# Patient Record
Sex: FEMALE | Race: ASIAN | NOT HISPANIC OR LATINO
[De-identification: names, ages, dates, MRNs, and addresses within clinical notes are randomized per-mention and may not be internally consistent; named-entity substitution may affect disease eponyms.]

---

## 2021-07-29 ENCOUNTER — TRANSCRIPTION ENCOUNTER (OUTPATIENT)
Age: 81
End: 2021-07-29

## 2021-07-29 ENCOUNTER — EMERGENCY (EMERGENCY)
Facility: HOSPITAL | Age: 81
LOS: 1 days | Discharge: HOME | End: 2021-07-29
Attending: EMERGENCY MEDICINE
Payer: MEDICARE

## 2021-07-29 VITALS
TEMPERATURE: 98 F | DIASTOLIC BLOOD PRESSURE: 78 MMHG | RESPIRATION RATE: 16 BRPM | SYSTOLIC BLOOD PRESSURE: 142 MMHG | HEART RATE: 71 BPM | OXYGEN SATURATION: 100 %

## 2021-07-29 VITALS
WEIGHT: 110.01 LBS | RESPIRATION RATE: 18 BRPM | OXYGEN SATURATION: 98 % | TEMPERATURE: 98 F | DIASTOLIC BLOOD PRESSURE: 87 MMHG | SYSTOLIC BLOOD PRESSURE: 153 MMHG | HEART RATE: 80 BPM

## 2021-07-29 LAB
ALBUMIN SERPL ELPH-MCNC: 4.4 G/DL — SIGNIFICANT CHANGE UP (ref 3.3–5)
ALP SERPL-CCNC: 92 U/L — SIGNIFICANT CHANGE UP (ref 40–120)
ALT FLD-CCNC: 17 U/L — SIGNIFICANT CHANGE UP (ref 10–45)
ANION GAP SERPL CALC-SCNC: 13 MMOL/L — SIGNIFICANT CHANGE UP (ref 5–17)
AST SERPL-CCNC: 17 U/L — SIGNIFICANT CHANGE UP (ref 10–40)
BASOPHILS # BLD AUTO: 0 K/UL — SIGNIFICANT CHANGE UP (ref 0–0.2)
BASOPHILS NFR BLD AUTO: 0 % — SIGNIFICANT CHANGE UP (ref 0–2)
BILIRUB SERPL-MCNC: 0.5 MG/DL — SIGNIFICANT CHANGE UP (ref 0.2–1.2)
BUN SERPL-MCNC: 10 MG/DL — SIGNIFICANT CHANGE UP (ref 7–23)
CALCIUM SERPL-MCNC: 9.8 MG/DL — SIGNIFICANT CHANGE UP (ref 8.4–10.5)
CHLORIDE SERPL-SCNC: 99 MMOL/L — SIGNIFICANT CHANGE UP (ref 96–108)
CO2 SERPL-SCNC: 25 MMOL/L — SIGNIFICANT CHANGE UP (ref 22–31)
CREAT SERPL-MCNC: 0.61 MG/DL — SIGNIFICANT CHANGE UP (ref 0.5–1.3)
EOSINOPHIL # BLD AUTO: 0 K/UL — SIGNIFICANT CHANGE UP (ref 0–0.5)
EOSINOPHIL NFR BLD AUTO: 0 % — SIGNIFICANT CHANGE UP (ref 0–6)
GLUCOSE SERPL-MCNC: 234 MG/DL — HIGH (ref 70–99)
HCT VFR BLD CALC: 45.7 % — HIGH (ref 34.5–45)
HGB BLD-MCNC: 15.3 G/DL — SIGNIFICANT CHANGE UP (ref 11.5–15.5)
LIDOCAIN IGE QN: 21 U/L — SIGNIFICANT CHANGE UP (ref 7–60)
LYMPHOCYTES # BLD AUTO: 2.18 K/UL — SIGNIFICANT CHANGE UP (ref 1–3.3)
LYMPHOCYTES # BLD AUTO: 22 % — SIGNIFICANT CHANGE UP (ref 13–44)
MAGNESIUM SERPL-MCNC: 2 MG/DL — SIGNIFICANT CHANGE UP (ref 1.6–2.6)
MANUAL SMEAR VERIFICATION: SIGNIFICANT CHANGE UP
MCHC RBC-ENTMCNC: 30.2 PG — SIGNIFICANT CHANGE UP (ref 27–34)
MCHC RBC-ENTMCNC: 33.5 GM/DL — SIGNIFICANT CHANGE UP (ref 32–36)
MCV RBC AUTO: 90.3 FL — SIGNIFICANT CHANGE UP (ref 80–100)
MONOCYTES # BLD AUTO: 0.89 K/UL — SIGNIFICANT CHANGE UP (ref 0–0.9)
MONOCYTES NFR BLD AUTO: 9 % — SIGNIFICANT CHANGE UP (ref 2–14)
NEUTROPHILS # BLD AUTO: 6.84 K/UL — SIGNIFICANT CHANGE UP (ref 1.8–7.4)
NEUTROPHILS NFR BLD AUTO: 66 % — SIGNIFICANT CHANGE UP (ref 43–77)
NEUTS BAND # BLD: 3 % — SIGNIFICANT CHANGE UP (ref 0–8)
NRBC # BLD: 0 /100 — SIGNIFICANT CHANGE UP (ref 0–0)
PLAT MORPH BLD: NORMAL — SIGNIFICANT CHANGE UP
PLATELET # BLD AUTO: 205 K/UL — SIGNIFICANT CHANGE UP (ref 150–400)
POTASSIUM SERPL-MCNC: 4 MMOL/L — SIGNIFICANT CHANGE UP (ref 3.5–5.3)
POTASSIUM SERPL-SCNC: 4 MMOL/L — SIGNIFICANT CHANGE UP (ref 3.5–5.3)
PROT SERPL-MCNC: 8.4 G/DL — HIGH (ref 6–8.3)
RBC # BLD: 5.06 M/UL — SIGNIFICANT CHANGE UP (ref 3.8–5.2)
RBC # FLD: 12.3 % — SIGNIFICANT CHANGE UP (ref 10.3–14.5)
RBC BLD AUTO: SIGNIFICANT CHANGE UP
SODIUM SERPL-SCNC: 137 MMOL/L — SIGNIFICANT CHANGE UP (ref 135–145)
TROPONIN T, HIGH SENSITIVITY RESULT: 6 NG/L — SIGNIFICANT CHANGE UP (ref 0–51)
WBC # BLD: 9.91 K/UL — SIGNIFICANT CHANGE UP (ref 3.8–10.5)
WBC # FLD AUTO: 9.91 K/UL — SIGNIFICANT CHANGE UP (ref 3.8–10.5)

## 2021-07-29 PROCEDURE — 84484 ASSAY OF TROPONIN QUANT: CPT

## 2021-07-29 PROCEDURE — 99284 EMERGENCY DEPT VISIT MOD MDM: CPT | Mod: 25

## 2021-07-29 PROCEDURE — 99285 EMERGENCY DEPT VISIT HI MDM: CPT

## 2021-07-29 PROCEDURE — 96374 THER/PROPH/DIAG INJ IV PUSH: CPT

## 2021-07-29 PROCEDURE — 93005 ELECTROCARDIOGRAM TRACING: CPT

## 2021-07-29 PROCEDURE — 80053 COMPREHEN METABOLIC PANEL: CPT

## 2021-07-29 PROCEDURE — 71045 X-RAY EXAM CHEST 1 VIEW: CPT | Mod: 26

## 2021-07-29 PROCEDURE — 83690 ASSAY OF LIPASE: CPT

## 2021-07-29 PROCEDURE — 71045 X-RAY EXAM CHEST 1 VIEW: CPT

## 2021-07-29 PROCEDURE — 83735 ASSAY OF MAGNESIUM: CPT

## 2021-07-29 PROCEDURE — 85025 COMPLETE CBC W/AUTO DIFF WBC: CPT

## 2021-07-29 PROCEDURE — 93010 ELECTROCARDIOGRAM REPORT: CPT

## 2021-07-29 RX ORDER — FAMOTIDINE 10 MG/ML
20 INJECTION INTRAVENOUS ONCE
Refills: 0 | Status: COMPLETED | OUTPATIENT
Start: 2021-07-29 | End: 2021-07-29

## 2021-07-29 RX ORDER — SUCRALFATE 1 G
1 TABLET ORAL ONCE
Refills: 0 | Status: COMPLETED | OUTPATIENT
Start: 2021-07-29 | End: 2021-07-29

## 2021-07-29 RX ORDER — FAMOTIDINE 10 MG/ML
1 INJECTION INTRAVENOUS
Qty: 28 | Refills: 0
Start: 2021-07-29 | End: 2021-08-11

## 2021-07-29 RX ADMIN — FAMOTIDINE 20 MILLIGRAM(S): 10 INJECTION INTRAVENOUS at 12:08

## 2021-07-29 RX ADMIN — Medication 30 MILLILITER(S): at 12:08

## 2021-07-29 RX ADMIN — Medication 1 GRAM(S): at 15:07

## 2021-07-29 NOTE — ED PROVIDER NOTE - CHIEF COMPLAINT
The patient is a 80y Female complaining of  The patient is a 80y Female complaining of abd discomfort

## 2021-07-29 NOTE — ED PROVIDER NOTE - PATIENT PORTAL LINK FT
You can access the FollowMyHealth Patient Portal offered by [facility] by registering at the following website: [email or web address]. By joining [facility]’s FollowMyHealth portal, you will also be able to view your health information using other applications (apps) compatible with our system.

## 2021-07-29 NOTE — ED PROVIDER NOTE - ATTENDING CONTRIBUTION TO CARE
80F hx HTN, hypothyroid, diabetes not on meds here with c/o epigastric abd pain x4 days, worse w eating, constant, non radiating, slight relief w omeprazole. No dark tarry or bloody stools. No NV, No fevers, no CP or SOB. PE well appearing NAD, RRR, lungs cta bl, abd soft, mild epigatsric TTP no grr. No le edema. Less likely cardiac though is an elderly diabetic female. EKG without actionable fidning. Check trop. More likely GI in origin. Check labs, meds, CXR to eval for air under diaphragms.

## 2021-07-29 NOTE — ED PROVIDER NOTE - NSFOLLOWUPINSTRUCTIONS_ED_ALL_ED_FT
There are no signs of emergency conditions requiring admission to the hospital on today's workup.  Based on the evaluation, a presumptive diagnosis was made, however, further evaluation may be required by your primary care physician or a specialist for a more definitive diagnosis.  Therefore, please follow-up as directed or return to the Emergency Department if your symptoms change or worsen.    We recommend that you:  1. See your primary care physician within the next 72 hours for follow up.  Bring a copy of your discharge paperwork (including any test results) to your doctor.  2. Please follow up with your gastroenterologist or the Gastroenterology clinic (information provided). Someone from [facility] will call you to help you make an appointment.   3. Please continue medications as prescribed.   4. Please avoid exacerbating foods such as spicy foods, coffee, alcohol.       *** Return immediately if you have worsening symptoms, chest pain, Shortness of Breath, abdominal pain, Nausea/Vomiting/Diarrhea, dizziness, weakness, confusion, vision changes, urinary symptoms, syncope, falls, trauma, discharge, bleeding, fevers, or any other new/concerning symptoms. *** There are no signs of emergency conditions requiring admission to the hospital on today's workup.  Based on the evaluation, a presumptive diagnosis was made, however, further evaluation may be required by your primary care physician or a specialist for a more definitive diagnosis.  Therefore, please follow-up as directed or return to the Emergency Department if your symptoms change or worsen.    We recommend that you:  1. See your primary care physician within the next 72 hours for follow up.  Bring a copy of your discharge paperwork (including any test results) to your doctor.  2. Please follow up with your gastroenterologist or the Gastroenterology clinic (information provided). Someone from [facility] will call you to help you make an appointment.   3. Please continue medications as prescribed.   4. Please avoid exacerbating foods such as spicy foods, coffee, alcohol.   5. Please take 1 tablet of 20mg Pepcid two times a day.  6. For additional relief take Maalox 1 hour before meals as needed.      *** Return immediately if you have worsening symptoms, chest pain, Shortness of Breath, abdominal pain, Nausea/Vomiting/Diarrhea, dizziness, weakness, confusion, vision changes, urinary symptoms, syncope, falls, trauma, discharge, bleeding, fevers, or any other new/concerning symptoms. ***

## 2021-07-29 NOTE — ED PROVIDER NOTE - PROGRESS NOTE DETAILS
Patient reassessed, resting in bed in no distress. still c.o some abd discofort will order carafate and reassess Nitish Cuello PA-C Patient reassessed, resting in bed in no distress. still c.o some abd discomfort will order Carafate and reassess Nitish Cuello PA-C Patient was able to tolerate PO challenge. Pt reassessed at bedside, feels well, pain controlled. Informed of workup in ED, reviewed lab and/or radiology results (when applicable) with patient/caregiver. Informed pt of plan for discharge with instructions to follow up with PMD and GI. Pt/caregiver expressed understanding of plan and agrees with plan for discharge. Strict return precautions discussed with patient in layman's terms, patient demonstrated understanding of return precautions. Nitish Cuello PA-C

## 2021-07-29 NOTE — ED PROVIDER NOTE - NS ED ROS FT
Review of Systems:  · Constitutional: no chills, no fever, no night sweats, no weight loss  · Nose: no epistaxis  · Mouth/Throat: no difficulty in swallowing, trachea midline, uvula midline  . Cardio: No CP, no palpitations, no chest pressure, no ripping chest pain  · Respiratory: no cough, no exertional dyspnea, no hemoptysis, no orthopnea, no shortness of breath  · Gastrointestinal: abdominal pain, no diarrhea, no melena, no nausea, no vomiting  · Genitourinary: no difficulty urinating, no dysuria, no hematuria  · MUSCULOSKELETAL: FROM of all extremities  · Skin: no abrasion; no bruising; no laceration  · Neurological: no change in level of consciousness, no headache, no seizures  · ROS STATEMENT: all other ROS negative except as per HPI Review of Systems:  · Constitutional: no chills, no fever, no night sweats, no weight loss  · Nose: no epistaxis  · Mouth/Throat: no difficulty in swallowing, trachea midline, uvula midline  . Cardio: No CP, no palpitations, no chest pressure, no ripping chest pain  · Respiratory: no cough, no exertional dyspnea, no hemoptysis, no orthopnea, no shortness of breath  · Gastrointestinal: abdominal pain, exacerbated with food, no diarrhea, no melena, no nausea, no vomiting  · Genitourinary: no difficulty urinating, no dysuria, no hematuria  · MUSCULOSKELETAL: FROM of all extremities  · Skin: no abrasion; no bruising; no laceration  · Neurological: no change in level of consciousness, no headache, no seizures  · ROS STATEMENT: all other ROS negative except as per HPI

## 2021-07-29 NOTE — ED PROVIDER NOTE - OBJECTIVE STATEMENT
80 year old fm with pmhx hypothyroid, DM, presents to the ED with abdominal discomfort x 5 days. patient reports sunday she was awaken with epigastric  abdominal pain and discomfort. patient reports since then there have been multiple episodes where the pain increased to the epigastric area. patient reports she took 20mg of omeprazole with some relief. patient decided to go to the ED for symptoms an EKG was done which showed "an abnormality" so they advised patient to go to the ED for evaluation. patient reports exacerbation of pain with food. patient reporting pain isolated to epigastric region. patient denies chest pain, Shortness of Breath, Nausea/Vomiting/Diarrhea, dizziness, weakness, confusion, vision changes, urinary symptoms, syncope, falls, trauma, discharge, bleeding, fevers 80 year old fm with pmhx hypothyroid, DM, presents to the ED with abdominal discomfort x 5 days. patient reports Sunday she was awaken with epigastric  abdominal pain and discomfort. patient reports since then there have been multiple episodes where the pain increased to the epigastric area. patient reports she took 20mg of omeprazole with some relief. patient decided to go to the ED for symptoms an EKG was done which showed "an abnormality" so they advised patient to go to the ED for evaluation. patient reports exacerbation of pain with food. patient reporting pain isolated to epigastric region. patient denies chest pain, Shortness of Breath, Nausea/Vomiting/Diarrhea, dizziness, weakness, confusion, vision changes, urinary symptoms, syncope, falls, trauma, discharge, bleeding, fevers

## 2021-07-29 NOTE — ED PROVIDER NOTE - PHYSICAL EXAMINATION
On Physical Exam:  General: well appearing, in NAD, speaking clearly in full sentences and without difficulty; cooperative with exam  HEENT: PERRL, MMM  Neck: no neck tenderness, no nuchal rigidity  Cardiac: normal s1, s2; RRR; no MGR  Lungs: CTABL  Abdomen: soft nontender/nondistended  : no bladder tenderness or distension  Skin: warm, intact, no rash  Extremities: no peripheral edema, no gross deformities  Neuro: no gross neurologic deficits On Physical Exam:  General: well appearing, in NAD, speaking clearly in full sentences and without difficulty; cooperative with exam  HEENT: PERRL, MMM  Neck: no neck tenderness, no nuchal rigidity  Cardiac: normal s1, s2; RRR; no MGR  Lungs: CTABL  Abdomen: slight discomfort to palpation to epigastric region, soft nontender/nondistended  : no bladder tenderness or distension  Skin: warm, intact, no rash  Extremities: no peripheral edema, no gross deformities  Neuro: no gross neurologic deficits

## 2022-02-11 ENCOUNTER — EMERGENCY (EMERGENCY)
Facility: HOSPITAL | Age: 82
LOS: 1 days | Discharge: HOME | End: 2022-02-11
Attending: EMERGENCY MEDICINE
Payer: MEDICARE

## 2022-02-11 VITALS
TEMPERATURE: 98 F | OXYGEN SATURATION: 95 % | RESPIRATION RATE: 18 BRPM | SYSTOLIC BLOOD PRESSURE: 157 MMHG | DIASTOLIC BLOOD PRESSURE: 79 MMHG | HEART RATE: 62 BPM

## 2022-02-11 VITALS
SYSTOLIC BLOOD PRESSURE: 158 MMHG | HEIGHT: 62 IN | DIASTOLIC BLOOD PRESSURE: 87 MMHG | HEART RATE: 68 BPM | OXYGEN SATURATION: 98 % | TEMPERATURE: 98 F | WEIGHT: 110.01 LBS | RESPIRATION RATE: 18 BRPM

## 2022-02-11 LAB
ALBUMIN SERPL ELPH-MCNC: 4.5 G/DL — SIGNIFICANT CHANGE UP (ref 3.3–5)
ALP SERPL-CCNC: 82 U/L — SIGNIFICANT CHANGE UP (ref 40–120)
ALT FLD-CCNC: 23 U/L — SIGNIFICANT CHANGE UP (ref 10–45)
ANION GAP SERPL CALC-SCNC: 15 MMOL/L — SIGNIFICANT CHANGE UP (ref 5–17)
APPEARANCE UR: CLEAR — SIGNIFICANT CHANGE UP
AST SERPL-CCNC: 22 U/L — SIGNIFICANT CHANGE UP (ref 10–40)
BACTERIA # UR AUTO: NEGATIVE — SIGNIFICANT CHANGE UP
BASE EXCESS BLDV CALC-SCNC: -10.5 MMOL/L — LOW (ref -2–2)
BASE EXCESS BLDV CALC-SCNC: 4.6 MMOL/L — HIGH (ref -2–2)
BASOPHILS # BLD AUTO: 0.03 K/UL — SIGNIFICANT CHANGE UP (ref 0–0.2)
BASOPHILS NFR BLD AUTO: 0.4 % — SIGNIFICANT CHANGE UP (ref 0–2)
BILIRUB SERPL-MCNC: 0.6 MG/DL — SIGNIFICANT CHANGE UP (ref 0.2–1.2)
BILIRUB UR-MCNC: NEGATIVE — SIGNIFICANT CHANGE UP
BUN SERPL-MCNC: 15 MG/DL — SIGNIFICANT CHANGE UP (ref 7–23)
CA-I SERPL-SCNC: 0.74 MMOL/L — LOW (ref 1.15–1.33)
CA-I SERPL-SCNC: 1.15 MMOL/L — SIGNIFICANT CHANGE UP (ref 1.15–1.33)
CALCIUM SERPL-MCNC: 9.3 MG/DL — SIGNIFICANT CHANGE UP (ref 8.4–10.5)
CHLORIDE BLDV-SCNC: 117 MMOL/L — HIGH (ref 96–108)
CHLORIDE BLDV-SCNC: 98 MMOL/L — SIGNIFICANT CHANGE UP (ref 96–108)
CHLORIDE SERPL-SCNC: 98 MMOL/L — SIGNIFICANT CHANGE UP (ref 96–108)
CO2 BLDV-SCNC: 15 MMOL/L — LOW (ref 22–26)
CO2 BLDV-SCNC: 30 MMOL/L — HIGH (ref 22–26)
CO2 SERPL-SCNC: 23 MMOL/L — SIGNIFICANT CHANGE UP (ref 22–31)
COLOR SPEC: COLORLESS — SIGNIFICANT CHANGE UP
CREAT SERPL-MCNC: 0.57 MG/DL — SIGNIFICANT CHANGE UP (ref 0.5–1.3)
DIFF PNL FLD: NEGATIVE — SIGNIFICANT CHANGE UP
EOSINOPHIL # BLD AUTO: 0.05 K/UL — SIGNIFICANT CHANGE UP (ref 0–0.5)
EOSINOPHIL NFR BLD AUTO: 0.6 % — SIGNIFICANT CHANGE UP (ref 0–6)
EPI CELLS # UR: 2 /HPF — SIGNIFICANT CHANGE UP
GAS PNL BLDV: 134 MMOL/L — LOW (ref 136–145)
GAS PNL BLDV: 142 MMOL/L — SIGNIFICANT CHANGE UP (ref 136–145)
GAS PNL BLDV: SIGNIFICANT CHANGE UP
GAS PNL BLDV: SIGNIFICANT CHANGE UP
GLUCOSE BLDV-MCNC: 157 MG/DL — HIGH (ref 70–99)
GLUCOSE BLDV-MCNC: 392 MG/DL — HIGH (ref 70–99)
GLUCOSE SERPL-MCNC: 328 MG/DL — HIGH (ref 70–99)
GLUCOSE UR QL: ABNORMAL
HCO3 BLDV-SCNC: 14 MMOL/L — LOW (ref 22–29)
HCO3 BLDV-SCNC: 29 MMOL/L — SIGNIFICANT CHANGE UP (ref 22–29)
HCT VFR BLD CALC: 44.4 % — SIGNIFICANT CHANGE UP (ref 34.5–45)
HCT VFR BLDA CALC: 27 % — LOW (ref 34.5–46.5)
HCT VFR BLDA CALC: 45 % — SIGNIFICANT CHANGE UP (ref 34.5–46.5)
HGB BLD CALC-MCNC: 14.9 G/DL — SIGNIFICANT CHANGE UP (ref 11.7–16.1)
HGB BLD CALC-MCNC: 9 G/DL — LOW (ref 11.7–16.1)
HGB BLD-MCNC: 15 G/DL — SIGNIFICANT CHANGE UP (ref 11.5–15.5)
HYALINE CASTS # UR AUTO: 0 /LPF — SIGNIFICANT CHANGE UP (ref 0–2)
IMM GRANULOCYTES NFR BLD AUTO: 0.4 % — SIGNIFICANT CHANGE UP (ref 0–1.5)
KETONES UR-MCNC: NEGATIVE — SIGNIFICANT CHANGE UP
LACTATE BLDV-MCNC: 1 MMOL/L — SIGNIFICANT CHANGE UP (ref 0.7–2)
LACTATE BLDV-MCNC: 2.6 MMOL/L — HIGH (ref 0.7–2)
LEUKOCYTE ESTERASE UR-ACNC: NEGATIVE — SIGNIFICANT CHANGE UP
LYMPHOCYTES # BLD AUTO: 1.91 K/UL — SIGNIFICANT CHANGE UP (ref 1–3.3)
LYMPHOCYTES # BLD AUTO: 23.8 % — SIGNIFICANT CHANGE UP (ref 13–44)
MCHC RBC-ENTMCNC: 31.1 PG — SIGNIFICANT CHANGE UP (ref 27–34)
MCHC RBC-ENTMCNC: 33.8 GM/DL — SIGNIFICANT CHANGE UP (ref 32–36)
MCV RBC AUTO: 91.9 FL — SIGNIFICANT CHANGE UP (ref 80–100)
MONOCYTES # BLD AUTO: 0.39 K/UL — SIGNIFICANT CHANGE UP (ref 0–0.9)
MONOCYTES NFR BLD AUTO: 4.9 % — SIGNIFICANT CHANGE UP (ref 2–14)
NEUTROPHILS # BLD AUTO: 5.61 K/UL — SIGNIFICANT CHANGE UP (ref 1.8–7.4)
NEUTROPHILS NFR BLD AUTO: 69.9 % — SIGNIFICANT CHANGE UP (ref 43–77)
NITRITE UR-MCNC: NEGATIVE — SIGNIFICANT CHANGE UP
NRBC # BLD: 0 /100 WBCS — SIGNIFICANT CHANGE UP (ref 0–0)
PCO2 BLDV: 25 MMHG — LOW (ref 39–42)
PCO2 BLDV: 42 MMHG — SIGNIFICANT CHANGE UP (ref 39–42)
PH BLDV: 7.35 — SIGNIFICANT CHANGE UP (ref 7.32–7.43)
PH BLDV: 7.45 — HIGH (ref 7.32–7.43)
PH UR: 8 — SIGNIFICANT CHANGE UP (ref 5–8)
PLATELET # BLD AUTO: 197 K/UL — SIGNIFICANT CHANGE UP (ref 150–400)
PO2 BLDV: 52 MMHG — HIGH (ref 25–45)
PO2 BLDV: 69 MMHG — HIGH (ref 25–45)
POTASSIUM BLDV-SCNC: 2.6 MMOL/L — CRITICAL LOW (ref 3.5–5.1)
POTASSIUM BLDV-SCNC: 3.3 MMOL/L — LOW (ref 3.5–5.1)
POTASSIUM SERPL-MCNC: 3.3 MMOL/L — LOW (ref 3.5–5.3)
POTASSIUM SERPL-SCNC: 3.3 MMOL/L — LOW (ref 3.5–5.3)
PROT SERPL-MCNC: 8.1 G/DL — SIGNIFICANT CHANGE UP (ref 6–8.3)
PROT UR-MCNC: ABNORMAL
RBC # BLD: 4.83 M/UL — SIGNIFICANT CHANGE UP (ref 3.8–5.2)
RBC # FLD: 12.4 % — SIGNIFICANT CHANGE UP (ref 10.3–14.5)
RBC CASTS # UR COMP ASSIST: 1 /HPF — SIGNIFICANT CHANGE UP (ref 0–4)
SAO2 % BLDV: 86.9 % — SIGNIFICANT CHANGE UP (ref 67–88)
SAO2 % BLDV: 94.9 % — HIGH (ref 67–88)
SODIUM SERPL-SCNC: 136 MMOL/L — SIGNIFICANT CHANGE UP (ref 135–145)
SP GR SPEC: 1.02 — SIGNIFICANT CHANGE UP (ref 1.01–1.02)
UROBILINOGEN FLD QL: NEGATIVE — SIGNIFICANT CHANGE UP
WBC # BLD: 8.02 K/UL — SIGNIFICANT CHANGE UP (ref 3.8–10.5)
WBC # FLD AUTO: 8.02 K/UL — SIGNIFICANT CHANGE UP (ref 3.8–10.5)
WBC UR QL: 0 /HPF — SIGNIFICANT CHANGE UP (ref 0–5)

## 2022-02-11 PROCEDURE — 81001 URINALYSIS AUTO W/SCOPE: CPT

## 2022-02-11 PROCEDURE — 84132 ASSAY OF SERUM POTASSIUM: CPT

## 2022-02-11 PROCEDURE — 93005 ELECTROCARDIOGRAM TRACING: CPT

## 2022-02-11 PROCEDURE — 99285 EMERGENCY DEPT VISIT HI MDM: CPT | Mod: 25

## 2022-02-11 PROCEDURE — 71045 X-RAY EXAM CHEST 1 VIEW: CPT | Mod: 26

## 2022-02-11 PROCEDURE — 70496 CT ANGIOGRAPHY HEAD: CPT | Mod: 26,MA

## 2022-02-11 PROCEDURE — 82803 BLOOD GASES ANY COMBINATION: CPT

## 2022-02-11 PROCEDURE — 85018 HEMOGLOBIN: CPT

## 2022-02-11 PROCEDURE — 80053 COMPREHEN METABOLIC PANEL: CPT

## 2022-02-11 PROCEDURE — 83735 ASSAY OF MAGNESIUM: CPT

## 2022-02-11 PROCEDURE — 85025 COMPLETE CBC W/AUTO DIFF WBC: CPT

## 2022-02-11 PROCEDURE — 83605 ASSAY OF LACTIC ACID: CPT

## 2022-02-11 PROCEDURE — 70450 CT HEAD/BRAIN W/O DYE: CPT | Mod: MA

## 2022-02-11 PROCEDURE — 70496 CT ANGIOGRAPHY HEAD: CPT | Mod: MA

## 2022-02-11 PROCEDURE — 70498 CT ANGIOGRAPHY NECK: CPT | Mod: MA

## 2022-02-11 PROCEDURE — 82947 ASSAY GLUCOSE BLOOD QUANT: CPT

## 2022-02-11 PROCEDURE — 84295 ASSAY OF SERUM SODIUM: CPT

## 2022-02-11 PROCEDURE — 71045 X-RAY EXAM CHEST 1 VIEW: CPT

## 2022-02-11 PROCEDURE — 82962 GLUCOSE BLOOD TEST: CPT

## 2022-02-11 PROCEDURE — 36415 COLL VENOUS BLD VENIPUNCTURE: CPT

## 2022-02-11 PROCEDURE — 93010 ELECTROCARDIOGRAM REPORT: CPT

## 2022-02-11 PROCEDURE — 82435 ASSAY OF BLOOD CHLORIDE: CPT

## 2022-02-11 PROCEDURE — 96374 THER/PROPH/DIAG INJ IV PUSH: CPT | Mod: XU

## 2022-02-11 PROCEDURE — 70498 CT ANGIOGRAPHY NECK: CPT | Mod: 26,MA

## 2022-02-11 PROCEDURE — 82330 ASSAY OF CALCIUM: CPT

## 2022-02-11 PROCEDURE — 85014 HEMATOCRIT: CPT

## 2022-02-11 RX ORDER — ONDANSETRON 8 MG/1
4 TABLET, FILM COATED ORAL ONCE
Refills: 0 | Status: COMPLETED | OUTPATIENT
Start: 2022-02-11 | End: 2022-02-11

## 2022-02-11 RX ORDER — SODIUM CHLORIDE 9 MG/ML
500 INJECTION INTRAMUSCULAR; INTRAVENOUS; SUBCUTANEOUS ONCE
Refills: 0 | Status: COMPLETED | OUTPATIENT
Start: 2022-02-11 | End: 2022-02-11

## 2022-02-11 RX ORDER — MECLIZINE HCL 12.5 MG
1 TABLET ORAL
Qty: 10 | Refills: 0
Start: 2022-02-11 | End: 2022-02-20

## 2022-02-11 RX ORDER — MECLIZINE HCL 12.5 MG
25 TABLET ORAL ONCE
Refills: 0 | Status: COMPLETED | OUTPATIENT
Start: 2022-02-11 | End: 2022-02-11

## 2022-02-11 RX ADMIN — Medication 25 MILLIGRAM(S): at 11:33

## 2022-02-11 RX ADMIN — SODIUM CHLORIDE 1000 MILLILITER(S): 9 INJECTION INTRAMUSCULAR; INTRAVENOUS; SUBCUTANEOUS at 11:30

## 2022-02-11 RX ADMIN — ONDANSETRON 4 MILLIGRAM(S): 8 TABLET, FILM COATED ORAL at 11:31

## 2022-02-11 NOTE — ED PROVIDER NOTE - NSFOLLOWUPINSTRUCTIONS_ED_ALL_ED_FT
A prescription was sent to your pharmacy to help treat your dizziness as needed. Please follow up with the neurology team with the information attached within 1 week.     Vertigo is the feeling that you or your surroundings are moving when they are not. Benign positional vertigo is the most common form of vertigo. This is usually a harmless condition (benign). This condition is positional. This means that symptoms are triggered by certain movements and positions.    This condition can be dangerous if it occurs while you are doing something that could cause harm to you or others. This includes activities such as driving or operating machinery.      What are the causes?    The inner ear has fluid-filled canals that help your brain sense movement and balance. When the fluid moves, the brain receives messages about your body's position.    With benign positional vertigo, crystals in the inner ear break free and disturb the inner ear area. This causes your brain to receive confusing messages about your body's position.      What increases the risk?    You are more likely to develop this condition if:  •You are a woman.      •You are 50 years of age or older.      •You have recently had a head injury.      •You have an inner ear disease.        What are the signs or symptoms?    Symptoms of this condition usually happen when you move your head or your eyes in different directions. Symptoms may start suddenly, and usually last for less than a minute. They include:  •Loss of balance and falling.      •Feeling like you are spinning or moving.      •Feeling like your surroundings are spinning or moving.      •Nausea and vomiting.      •Blurred vision.      •Dizziness.      •Involuntary eye movement (nystagmus).      Symptoms can be mild and cause only minor problems, or they can be severe and interfere with daily life. Episodes of benign positional vertigo may return (recur) over time. Symptoms may improve over time.      How is this diagnosed?    This condition may be diagnosed based on:  •Your medical history.      •Physical exam of the head, neck, and ears.      •Positional tests to check for or stimulate vertigo. You may be asked to turn your head and change positions, such as going from sitting to lying down. A health care provider will watch for symptoms of vertigo.      You may be referred to a health care provider who specializes in ear, nose, and throat problems (ENT, or otolaryngologist) or a provider who specializes in disorders of the nervous system (neurologist).      How is this treated?     This condition may be treated in a session in which your health care provider moves your head in specific positions to help the displaced crystals in your inner ear move. Treatment for this condition may take several sessions. Surgery may be needed in severe cases, but this is rare.    In some cases, benign positional vertigo may resolve on its own in 2–4 weeks.      Follow these instructions at home:    Safety     •Move slowly. Avoid sudden body or head movements or certain positions, as told by your health care provider.      •Avoid driving until your health care provider says it is safe for you to do so.      •Avoid operating heavy machinery until your health care provider says it is safe for you to do so.      •Avoid doing any tasks that would be dangerous to you or others if vertigo occurs.      •If you have trouble walking or keeping your balance, try using a cane for stability. If you feel dizzy or unstable, sit down right away.      •Return to your normal activities as told by your health care provider. Ask your health care provider what activities are safe for you.      General instructions     •Take over-the-counter and prescription medicines only as told by your health care provider.      •Drink enough fluid to keep your urine pale yellow.      •Keep all follow-up visits as told by your health care provider. This is important.        Contact a health care provider if:    •You have a fever.      •Your condition gets worse or you develop new symptoms.      •Your family or friends notice any behavioral changes.      •You have nausea or vomiting that gets worse.      •You have numbness or a prickling and tingling sensation.        Get help right away if you:    •Have difficulty speaking or moving.      •Are always dizzy.      •Faint.      •Develop severe headaches.      •Have weakness in your legs or arms.      •Have changes in your hearing or vision.      •Develop a stiff neck.      •Develop sensitivity to light.        Summary    •Vertigo is the feeling that you or your surroundings are moving when they are not. Benign positional vertigo is the most common form of vertigo.      •This condition is caused by crystals in the inner ear that become displaced. This causes a disturbance in an area of the inner ear that helps your brain sense movement and balance.      •Symptoms include loss of balance and falling, feeling that you or your surroundings are moving, nausea and vomiting, and blurred vision.      •This condition can be diagnosed based on symptoms, a physical exam, and positional tests.      •Follow safety instructions as told by your health care provider. You will also be told when to contact your health care provider in case of problems.

## 2022-02-11 NOTE — ED PROVIDER NOTE - CLINICAL SUMMARY MEDICAL DECISION MAKING FREE TEXT BOX
**ATTENDING MEDICAL DECISION MAKING/SYNTHESIS (Dr. Alon Nino): I have reviewed the Chief Concern(s), the HPI, the ROS, and have directly performed and confirmed the findings on the Physical Examination. I have reviewed the medical decision making with all providers, as applicable. The PROBLEM REPRESENTATION at this time is: 81-year-old woman with history of Hypertension, Diabetes Mellitus t2, and hypothyroidism now presenting with acute onset (this AM after breakfast at 08:00) of vertiginous dizziness, shortness of breath without cough, nausea, vomiting (without hematemesis, melena, hematochezia, or coffee-ground emesis) or severe headache. POSITIVE positional component without visual changes. NO chest pain, palpitations, abdominal pain, back pain, neck pain, syncope, or near-syncope. NO visual or speech changes. NO focal or generalized weakness. NO numbness, tingling, weakness, or paresthesias. The MOST LIKELY DIAGNOSIS, and the LIST OF DIFFERENTIAL DIAGNOSES, includes (but is not limited to) the following: cerebrovascular accident, transient ischemic attack, vertebrobasilar insufficiency or equivalent, mass/tumor, serious bacterial infection or sepsis/severe sepsis e.g. meningococcemia, meningitis, encephalitis, or equivalent, dehydration, electrolyte-metabolic-endocrine derangements, vascular cause e.g. carotid dissection or equivalent, demyelinating disorder. The likelihood of each of these diagnoses has been appropriately considered in the context of this patient's presentation and my evaluation. PLAN: as described in EMR, including diagnostics, therapeutics and consultation as clinically warranted. I will continue to reevaluate the patient, including the results of all testing, and monitor response to therapy throughout the patient's course in the ED. **ATTENDING MEDICAL DECISION MAKING/SYNTHESIS (Dr. Alon Nino): I have reviewed the Chief Concern(s), the HPI, the ROS, and have directly performed and confirmed the findings on the Physical Examination. I have reviewed the medical decision making with all providers, as applicable. The PROBLEM REPRESENTATION at this time is: 81-year-old woman with history of Hypertension, Diabetes Mellitus t2, and hypothyroidism now presenting with acute onset (this AM after breakfast at 08:00) of vertiginous dizziness, shortness of breath without cough, nausea, vomiting (without hematemesis, melena, hematochezia, or coffee-ground emesis) or severe headache. POSITIVE positional component without visual changes. NO chest pain, palpitations, abdominal pain, back pain, neck pain, syncope, or near-syncope. NO visual or speech changes. NO focal or generalized weakness. NO numbness, tingling, weakness, or paresthesias. The MOST LIKELY DIAGNOSIS, and the LIST OF DIFFERENTIAL DIAGNOSES, includes (but is not limited to) the following: cerebrovascular accident, transient ischemic attack, vertebrobasilar insufficiency or equivalent, mass/tumor, serious bacterial infection or sepsis/severe sepsis e.g. meningococcemia, meningitis, encephalitis, or equivalent, dehydration, electrolyte-metabolic-endocrine derangements (e.g., DKA or equivalent), vascular cause e.g. carotid dissection or equivalent, demyelinating disorder, seizure, other inflammatory or infectious cause, ADR/SE (from diabetic medications, unlikely), arrhythmia, acute coronary syndrome. The likelihood of each of these diagnoses has been appropriately considered in the context of this patient's presentation and my evaluation. PLAN: as described in EMR, including diagnostics, therapeutics and consultation as clinically warranted. I will continue to reevaluate the patient, including the results of all testing, and monitor response to therapy throughout the patient's course in the ED.

## 2022-02-11 NOTE — ED PROVIDER NOTE - OBJECTIVE STATEMENT
81y F PMH hypothyroid, HTN, DM presenting with several days of SOB on exertion and acute episode of n/v and dizziness this morning after breakfast. Reports acute onset of spinning sensation after eating, denies syncopal episode, LOC, or fall. Had 1 episode of NBNB emesis after breakfast, endorses discomfort and dizziness with head movement and eye movement. Never had similar sensation in the past. Denies focal numbness, tingling, or weakness. Feels uneasy since onset of symptoms this morning; called daughter to bring her to ER. Denies fever, chills, chest pain, abdominal discomfort, n/v, diarrhea, extremity swelling, rash. 81y F PMH hypothyroid, HTN, DM on Januvia, presenting with several days of SOB on exertion and acute episode of n/v and dizziness this morning after breakfast. Reports acute onset of spinning sensation after eating, denies syncopal episode, LOC, or fall. Had 1 episode of NBNB emesis after breakfast, endorses discomfort and dizziness with head movement and eye movement. Never had similar sensation in the past. Denies focal numbness, tingling, or weakness. Feels uneasy since onset of symptoms this morning; called daughter to bring her to ER. Denies fever, chills, chest pain, abdominal discomfort, n/v, diarrhea, extremity swelling, rash. 81y F PMH hypothyroid, HTN, DM on Januvia, presenting with several days of SOB on exertion and acute episode of n/v and dizziness this morning after breakfast. Reports acute onset of spinning sensation after eating, denies syncopal episode, LOC, or fall. Had 1 episode of NBNB emesis after breakfast, endorses discomfort and dizziness with head movement and eye movement. Never had similar sensation in the past. Denies focal numbness, tingling, or weakness. Feels uneasy since onset of symptoms this morning; called daughter to bring her to ER. Denies fever, chills, chest pain, abdominal discomfort, n/v, diarrhea, extremity swelling, rash.  **ATTENDING ADDENDUM (Dr. Alon Nino): I attest that I have directly and personally interviewed and examined this patient and elicited a comparable history of present illness and review of systems as documented, along with my EM resident. I attest that I have made significant contributions to the documentation where necessary and as noted in the EMR.

## 2022-02-11 NOTE — ED PROVIDER NOTE - CRANIAL NERVE AND PUPILLARY EXAM
**ATTENDING ADDENDUM (Dr. Alon Nino): POSITIVE nystagmus as noted above./central vision intact/extra-ocular movements intact/gag reflex intact/tongue is midline

## 2022-02-11 NOTE — ED PROVIDER NOTE - ENMT, MLM
Airway patent, Nasal mucosa clear. Mouth with normal mucosa. Throat has no vesicles, no oropharyngeal exudates and uvula is midline. **ATTENDING ADDENDUM (Dr. Alon Nino): NO droop bilaterally. NO tongue deficits.

## 2022-02-11 NOTE — ED PROVIDER NOTE - ATTENDING CONTRIBUTION TO CARE
**ATTENDING ADDENDUM (Dr. Alon Nino): I attest that I have directly examined this patient and reviewed and formulated the diagnostic and therapeutic management plan in collaboration with the EM resident. Please see MDM note and remainder of EMR for findings from CC, HPI, ROS, and PE. (MS4:Alok) **ATTENDING ADDENDUM (Dr. Alon Nino): I attest that I have directly examined this patient and reviewed and formulated the diagnostic and therapeutic management plan in collaboration with the EM resident and the MS4 on rotation in the ED. Please see MDM note and remainder of EMR for findings from CC, HPI, ROS, and PE. (Lenin, MS4; Alok)

## 2022-02-11 NOTE — ED PROVIDER NOTE - PROGRESS NOTE DETAILS
**ATTENDING ADDENDUM (Dr. Alon Nino): Agree with goals/plan of ED care as described in EMR, including diagnostics, therapeutics and consultation as clinically warranted. Will continue to observe and monitor closely. Anticipatory guidance provided by ED team at time of initial presentation. **ATTENDING ADDENDUM (Dr. Alon Nino): Agree with goals/plan of ED care as described in EMR, including diagnostics, therapeutics and consultation as clinically warranted. Will continue to observe and monitor closely. Anticipatory guidance provided by ED team at time of initial presentation. Point-of-care serum glucose concentration at time of presentation: 322 mg/dL. Lenin, PGY-2  Patient reassessed and reports improvement in dizziness and feeling much better. Able to ambulate. Will d/c with return precautions. Incidental finding of thyroid nodule communicated to patient and daughter **ATTENDING ADDENDUM (Dr. Alon Nino): patient serially evaluated throughout ED course by ED team. NO acute deterioration up to this time in the ED. ED diagnostics up to this time acknowledged, reviewed and noted. Improved and feels better. Likely peripheral vertigo (favored over central vertigo). Extensive anticipatory guidance provided to patient +/or family member(s) personally by me and other ED team members throughout ED course. Agree with likely discharge home with close outpatient followup with primary care physician/provider and with medications as prescribed. Will recheck point-of-care serum glucose concentration prior to and at time of anticipated discharge. Will continue to observe and monitor closely. Lenin, PGY-2  Patient reassessed and reports improvement in dizziness and feeling much better. Able to ambulate. Will d/c with return precautions. Incidental finding of thyroid nodule communicated to patient and daughter  **ATTENDING ADDENDUM (Dr. Alon Nino): patient serially evaluated throughout ED course by ED team. NO acute deterioration up to this time in the ED. Agree with above notation by EM resident Leinn. Repeat point-of-care serum glucose concentration noted. Agree with discharge home with close outpatient followup with primary care physician/provider. Anticipatory guidance provided by ED team at time of discharge. Patient appears STABLE for discharge at this time. NO evidence of central vertigo or diabetic or other endocrinologic emergency at this time. **ATTENDING ADDENDUM (Dr. Alon Nino): patient serially evaluated throughout ED course by ED team. NO acute deterioration up to this time in the ED. Personally reassessed. Agree with goals/plan of ED care as described in EMR, including diagnostics, therapeutics and consultation as clinically warranted. Still awaiting completion of radiologic diagnostics, but suspect peripheral over central vertigo at this time. NO evidence of obvious cerebrovascular accident, transient ischemic attack, vertebrobasilar insufficiency, acute coronary syndrome, arrhythmia, mass/tumor, or seizure. Will continue to observe and monitor closely. Extensive anticipatory guidance provided to patient +/or family member(s) personally by me and other ED team members throughout ED course.

## 2022-02-11 NOTE — ED PROVIDER NOTE - GASTROINTESTINAL, MLM
Abdomen soft, non-tender **ATTENDING ADDENDUM (Dr. Alon Nino): non-distended. NO guarding, rebound, or rigidity. NO pulsatile or non-pulsatile masses. NO hernias. NO obvious hepatosplenomegaly.

## 2022-02-11 NOTE — ED PROVIDER NOTE - RESPIRATORY, MLM
Breath sounds clear and equal bilaterally. **ATTENDING ADDENDUM (Dr. Alon Nino): NO wheezing, rales, rhonchi, crackles, stridor, drooling, retractions, nasal flaring, or tripoding.

## 2022-02-11 NOTE — ED PROVIDER NOTE - PATIENT PORTAL LINK FT
You can access the FollowMyHealth Patient Portal offered by [facility] by registering at the following website: [email or web address]. By joining [employer]’s FollowMyHealth portal, you will also be able to view your health information using other applications (apps) compatible with our system.

## 2022-02-11 NOTE — ED PROVIDER NOTE - LAB INTERPRETATION
**ATTENDING ADDENDUM (Dr. Alon Nino): Labs reviewed and analyzed. Pertinent findings include: initial lactate noted (significance uncertain), UA NEGATIVE except for glycosuria (NO ketonuria). pH on VBG within normal parameters (NO evidence of DKA at this time). NO profound or severe electrolyte-metabolic-endocrine derangements at this time.

## 2022-02-11 NOTE — ED PROVIDER NOTE - MUSCULOSKELETAL, MLM
Spine appears normal, range of motion is not limited, no muscle or joint tenderness **ATTENDING ADDENDUM (Dr. Alon Nino): Symmetrically equal strength, 5/5, in the bilateral upper and lower extremities. NO cords, soft-tissue swelling or calf tenderness in the bilateral lower extremities.

## 2022-02-11 NOTE — ED PROVIDER NOTE - NS ED ROS FT
**ATTENDING ADDENDUM (Dr. Alon Nino): During my interview with the patient, I have personally obtained and/or have directly verified the elements in the past medical/surgical history and other histories as noted earlier in the EMR, in conjunction with the other members (EM resident/PA/NP) of the patient care team. I have also personally obtained and/or have directly verified/reviewed the review of systems as documented below, in conjunction with the other members (EM resident/PA/NP) of the patient care team.

## 2022-02-11 NOTE — ED PROVIDER NOTE - CHPI ED SYMPTOMS NEG
no blurred vision/no confusion/no fever/no loss of consciousness/no numbness/no change in level of consciousness

## 2022-02-11 NOTE — ED PROVIDER NOTE - BILATERAL EYES
Clear bilaterally, pupils equal, round and reactive to light. **ATTENDING ADDENDUM (Dr. Alon Nino): Extraocular muscle movements intact. Clear corneas bilaterally, pupils equal and round. POSITIVE mild horizontal nystagmus with extreme lateral gaze./clear/pupils equal, round, and reactive to light

## 2022-02-11 NOTE — ED PROVIDER NOTE - CHIEF COMPLAINT
The patient is a 81y Female complaining of shortness of breath. The patient is a 81-year-old woman with history of Diabetes Mellitus , hypothyroidism, and Hypertension now presenting with concern for "shortness of breath" and "breathlessness" in context of acute dizziness and vertiginous experience with nausea/vomiting after eating breakfast this AM. Noted with hyperglycemia in triage (serum glucose concentration > 300+ mg/dL; patient did not take her diabetic medications this AM).

## 2022-02-11 NOTE — ED ADULT NURSE REASSESSMENT NOTE - NS ED NURSE REASSESS COMMENT FT1
pt remains stable in ED, A&Ox3, breathing spontaneous, unlabored w/o distress on room air, feeling much better, no longer dizzy, awaits dispo

## 2022-02-11 NOTE — ED PROVIDER NOTE - PHYSICAL EXAMINATION
**ATTENDING ADDENDUM (Dr. Alon Nino): I have reviewed and substantially contributed to the elements of the PE as documented above. I have directly performed an examination of this patient in conjunction with the other members (EM resident/PA/NP) of the patient care team. I have personally reviewed the patient's vital signs at the time of the patient's initial presentation to the ED and repeatedly throughout the ED course. **ATTENDING ADDENDUM (Dr. Alon Nino): I have reviewed and substantially contributed to the elements of the PE as documented above. I have directly performed an examination of this patient in conjunction with the other members (EM resident/PA/NP) of the patient care team. I have personally reviewed the patient's vital signs at the time of the patient's initial presentation to the ED and repeatedly throughout the ED course.    PHYSICAL EXAM:    GENERAL: NAD, well-developed, tired-appearing elderly female  HEAD:  Atraumatic, Normocephalic  EYES: EOMI, PERRLA, conjunctiva and sclera clear  NECK: Supple, No JVD, No LAD  CHEST/LUNG: Clear to auscultation bilaterally; No wheezing, rales, or rhonchi  HEART: Regular rate and rhythm; S1, S2; No murmurs, rubs, or gallops  ABDOMEN: Soft, Nontender, Nondistended; Normoactive bowel sounds  EXTREMITIES:  2+ Peripheral Pulses; No clubbing, cyanosis, or edema  PSYCH: A&Ox3  NEUROLOGY: Speech fluent and intact. Follows commands appropriately. CN 2-12 intact. Extraocular movements intact, +horizontal nystagmus. Pupils equally round and reactive to light. Facial sensation V1-3 equal and intact. Face symmetric with normal eye closure and smile. Hearing intact bilaterally. Head turning intact, with notable discomfort on active movement. Normal bulk and tone, no pronator drift. Strength 5/5 bilateral upper and lower extremities. Sensation intact to light touch bilaterally. No dysmetria on finger to nose bilaterally. Gait not assessed  SKIN: No rashes or lesions **ATTENDING ADDENDUM (Dr. Alon Nino): I have reviewed and substantially contributed to the elements of the PE as documented above. I have directly performed an examination of this patient in conjunction with the other members (EM resident/PA/NP) of the patient care team. I have personally reviewed the patient's vital signs at the time of the patient's initial presentation to the ED and repeatedly throughout the ED course.    PHYSICAL EXAM:    GENERAL: NAD, well-developed, tired-appearing elderly female  HEAD:  Atraumatic, Normocephalic  EYES: EOMI, PERRLA, conjunctiva and sclera clear, horizontal nystagmus with EOMI   NECK: Supple, No JVD, No LAD  CHEST/LUNG: Clear to auscultation bilaterally; No wheezing, rales, or rhonchi  HEART: Regular rate and rhythm; S1, S2; No murmurs, rubs, or gallops  ABDOMEN: Soft, Nontender, Nondistended; Normoactive bowel sounds  EXTREMITIES:  2+ Peripheral Pulses; No clubbing, cyanosis, or edema  PSYCH: A&Ox3  NEUROLOGY: Speech fluent and intact. Follows commands appropriately. CN 2-12 intact. Extraocular movements intact, +horizontal nystagmus. Pupils equally round and reactive to light. Facial sensation V1-3 equal and intact. Face symmetric with normal eye closure and smile. Hearing intact bilaterally. Head turning intact, with notable discomfort on active movement. Normal bulk and tone, no pronator drift. Strength 5/5 bilateral upper and lower extremities. Sensation intact to light touch bilaterally. No dysmetria on finger to nose bilaterally. Gait not assessed  SKIN: No rashes or lesions PHYSICAL EXAM:    GENERAL: NAD, well-developed, tired-appearing elderly female  HEAD:  Atraumatic, Normocephalic  EYES: EOMI, PERRLA, conjunctiva and sclera clear, horizontal nystagmus with EOMI   NECK: Supple, No JVD, No LAD  CHEST/LUNG: Clear to auscultation bilaterally; No wheezing, rales, or rhonchi  HEART: Regular rate and rhythm; S1, S2; No murmurs, rubs, or gallops  ABDOMEN: Soft, Nontender, Nondistended; Normoactive bowel sounds  EXTREMITIES:  2+ Peripheral Pulses; No clubbing, cyanosis, or edema  PSYCH: A&Ox3  NEUROLOGY: Speech fluent and intact. Follows commands appropriately. CN 2-12 intact. Extraocular movements intact, +horizontal nystagmus. Pupils equally round and reactive to light. Facial sensation V1-3 equal and intact. Face symmetric with normal eye closure and smile. Hearing intact bilaterally. Head turning intact, with notable discomfort on active movement. Normal bulk and tone, no pronator drift. Strength 5/5 bilateral upper and lower extremities. Sensation intact to light touch bilaterally. No dysmetria on finger to nose bilaterally. Gait not assessed  SKIN: No rashes or lesions  **ATTENDING ADDENDUM (Dr. Alon Nino): I have reviewed and substantially contributed to the elements of the PE as documented above. I have directly performed an examination of this patient in conjunction with the other members (EM resident/PA/NP) of the patient care team. I have personally reviewed the patient's vital signs at the time of the patient's initial presentation to the ED and repeatedly throughout the ED course.

## 2022-02-11 NOTE — ED PROVIDER NOTE - CHPI ED SYMPTOMS POS
**ATTENDING ADDENDUM (Dr. Alon Nino): POSITIVE shortness of breath, now resolved at time of arrival and with rest (non-movement)/DIZZINESS/NAUSEA/VOMITING

## 2022-02-11 NOTE — ED PROVIDER NOTE - NSICDXPASTMEDICALHX_GEN_ALL_CORE_FT
PAST MEDICAL HISTORY:  HTN (hypertension)     Hypothyroid      PAST MEDICAL HISTORY:  Diabetes mellitus     HTN (hypertension)     Hypothyroid

## 2022-02-11 NOTE — ED PROVIDER NOTE - NSFOLLOWUPCLINICS_GEN_ALL_ED_FT
[facility]  Neurology  [address] [place] [address] [address] [place] [address]  Phone: [phone]  Fax:

## 2022-02-11 NOTE — ED PROVIDER NOTE - CARE PLAN
Goal:	**ATTENDING ADDENDUM (Dr. Alon Nino): Goals of care include resolution of emergent/urgent symptoms and concerns, and restoration to baseline level of homeostasis.   Principal Discharge DX:	Vertigo  Goal:	**ATTENDING ADDENDUM (Dr. Alon Nino): Goals of care include resolution of emergent/urgent symptoms and concerns, and restoration to baseline level of homeostasis.  Secondary Diagnosis:	Hyperglycemia  Secondary Diagnosis:	DM (diabetes mellitus)  Secondary Diagnosis:	Hypothyroidism  Secondary Diagnosis:	Nausea & vomiting   1

## 2022-02-11 NOTE — ED ADULT NURSE NOTE - OBJECTIVE STATEMENT
80 y/o F pt w/ pmh of DM, HTN, hypothyroidism, present to ED for dizziness, nausea, vomiting and SOB, pt report she was having breakfast when she had sudden dizziness, room spinning, worsen with movement, got nauseous, had SOB, then vomited, on exam pt A&Ox4, PERRL 3mm, neg facial droop, neg slurred speech, neg arm drift, equal b/l  strength and sensation in all four extremities, no vision changes, report midsternal discomfort, denies weakness, numbness, tingling, LOC, no loss of bladder or bowel, seen and eval by MD, tres placed, labs drawn and sent, placed CM NSR, EKG done and given to MD

## 2022-02-11 NOTE — ED PROVIDER NOTE - PLAN OF CARE
**ATTENDING ADDENDUM (Dr. Alon Nino): Goals of care include resolution of emergent/urgent symptoms and concerns, and restoration to baseline level of homeostasis.

## 2022-05-06 PROBLEM — E11.9 TYPE 2 DIABETES MELLITUS WITHOUT COMPLICATIONS: Chronic | Status: ACTIVE | Noted: 2022-02-13

## 2022-05-29 ENCOUNTER — EMERGENCY (EMERGENCY)
Facility: HOSPITAL | Age: 82
LOS: 1 days | Discharge: HOME | End: 2022-05-29
Attending: STUDENT IN AN ORGANIZED HEALTH CARE EDUCATION/TRAINING PROGRAM
Payer: MEDICARE

## 2022-05-29 VITALS
DIASTOLIC BLOOD PRESSURE: 72 MMHG | HEART RATE: 75 BPM | TEMPERATURE: 98 F | SYSTOLIC BLOOD PRESSURE: 161 MMHG | WEIGHT: 115.96 LBS | RESPIRATION RATE: 18 BRPM | HEIGHT: 62 IN | OXYGEN SATURATION: 99 %

## 2022-05-29 VITALS
OXYGEN SATURATION: 99 % | SYSTOLIC BLOOD PRESSURE: 172 MMHG | TEMPERATURE: 98 F | DIASTOLIC BLOOD PRESSURE: 69 MMHG | HEART RATE: 74 BPM | RESPIRATION RATE: 17 BRPM

## 2022-05-29 PROCEDURE — 99283 EMERGENCY DEPT VISIT LOW MDM: CPT

## 2022-05-29 RX ORDER — DIPHENHYDRAMINE HCL 50 MG
1 CAPSULE ORAL
Qty: 28 | Refills: 0
Start: 2022-05-29 | End: 2022-06-04

## 2022-05-29 RX ORDER — DIPHENHYDRAMINE HCL 50 MG
25 CAPSULE ORAL ONCE
Refills: 0 | Status: COMPLETED | OUTPATIENT
Start: 2022-05-29 | End: 2022-05-29

## 2022-05-29 RX ORDER — HYDROCORTISONE 1 %
1 OINTMENT (GRAM) TOPICAL
Qty: 1 | Refills: 0
Start: 2022-05-29 | End: 2022-06-04

## 2022-05-29 RX ORDER — DIPHENHYDRAMINE HCL 50 MG
50 CAPSULE ORAL ONCE
Refills: 0 | Status: COMPLETED | OUTPATIENT
Start: 2022-05-29 | End: 2022-05-29

## 2022-05-29 RX ADMIN — Medication 25 MILLIGRAM(S): at 20:56

## 2022-05-29 NOTE — ED ADULT NURSE NOTE - NSIMPLEMENTINTERV_GEN_ALL_ED
Implemented All Universal Safety Interventions:  Petty to call system. Call bell, personal items and telephone within reach. Instruct patient to call for assistance. Room bathroom lighting operational. Non-slip footwear when patient is off stretcher. Physically safe environment: no spills, clutter or unnecessary equipment. Stretcher in lowest position, wheels locked, appropriate side rails in place.

## 2022-05-29 NOTE — ED PROVIDER NOTE - PHYSICAL EXAMINATION
GENERAL: Awake, alert, NAD  HEENT: NC/AT, moist mucous membranes  LUNGS: CTAB, no wheezes or crackles   CARDIAC: RRR, no m/r/g  ABDOMEN: Soft, normal BS, non tender, non distended, no rebound, no guarding  BACK: No midline spinal tenderness, no CVA tenderness  EXT: No edema, no calf tenderness, 2+ DP pulses bilaterally, no deformities.  NEURO: A&Ox3. Moving all extremities.  SKIN: Warm and dry. +excoriation marks on bilateral extremities, few raised lesions on hands and chest wall, no intraoral lesions or lesions of feet  PSYCH: Normal affect.

## 2022-05-29 NOTE — ED ADULT NURSE NOTE - CHPI ED NUR SYMPTOMS NEG
no body aches/no chills/no confusion/no decreased eating/drinking/no fever/no pain/no scaly patches on skin/no vomiting

## 2022-05-29 NOTE — ED PROVIDER NOTE - ATTENDING CONTRIBUTION TO CARE
I, Azeem Ventura, performed a history and physical exam of the patient and discussed their management with the resident and/or advanced care provider. I reviewed the resident and/or advanced care provider's note and agree with the documented findings and plan of care. I was present and available for all procedures.    82 y/o F with PMH HTN, DM, hypothyroidism presenting to the ED with rash. Patient states she has noticed a rash on her b/l upper arms for the past week to ten days. It started after working in her garden and planting new lilies. She states the rash is itchy and worse at night. Now has spread to her posterior neck and some of her chest. She has tried benadryl at home but has been using inconsistently, also using calamine and aloe. She denies systemic symptoms, no fever, chills, N/V, SOB, difficulty swallowing, diarrhea.    Well appearing and in NAD, head normal appearing atraumatic, trachea midline, no respiratory distress, lungs cta bilaterally, rrr no murmurs, soft NT ND abdomen, no visible extremity deformities, Alert and oriented, non focal neuro exam, skin warm and dry, small hives to bilateral forearms extending up to elbow region, excoriations from scratching otherwise no mucosal involvement no other facial or bodily involvement, normal affect and mood, no rash to soles     well appearing rash cw contact dermatitis likely from plant irritant will give benadryl and hydrocort otherwise derm Follow up return precautions

## 2022-05-29 NOTE — ED PROVIDER NOTE - CLINICAL SUMMARY MEDICAL DECISION MAKING FREE TEXT BOX
82 y/o F presenting to the ED with rash. Vitals stable. Patient is well appearing in NAD. Exam with rash to bilateral upper extremities, chest, posterior neck. Will treat with benadryl and hydrocortisone cream. Provide derm follow up. Eloy Mondragon, DO PGY3

## 2022-05-29 NOTE — ED PROVIDER NOTE - OBJECTIVE STATEMENT
80 y/o F with PMH HTN, DM, hypothyroidism presenting to the ED with rash. Patient states she has noticed a rash on her b/l upper arms for the past week to ten days. It started after working in her garden and planting new lilies. She states the rash is itchy and worse at night. Now has spread to her posterior neck and some of her chest. She has tried benadryl at home but has been using inconsistently, also using calamine and aloe. She denies systemic symptoms, no fever, chills, N/V, SOB, difficulty swallowing, diarrhea.

## 2022-05-29 NOTE — ED ADULT TRIAGE NOTE - CHIEF COMPLAINT QUOTE
worsening pruritic rash started on hands now up neck and on face after gardening 5/20, tried zyrtec and calamine lotion as per urgent care recs w/o relief

## 2022-05-29 NOTE — ED PROVIDER NOTE - NSFOLLOWUPCLINICS_GEN_ALL_ED_FT
[facility]  [address], [address]  [place] [address]  Phone: [phone]  Fax: [phone]  Follow Up Time: 1-3 Days

## 2022-05-29 NOTE — ED PROVIDER NOTE - NSFOLLOWUPINSTRUCTIONS_ED_ALL_ED_FT
Please use benadryl every 6 hours and hydrocortisone cream 2x daily for relief of itching.    Follow up with dermatology for any continuous symptoms or rash that does not resolve.    Rash    A rash is a change in the color of the skin. A rash can also change the way your skin feels. There are many different conditions and factors that can cause a rash, most of which are not dangerous. Make sure to follow up with your primary care physician or a dermatologist as instructed by your health care provider.    SEEK IMMEDIATE MEDICAL CARE IF YOU HAVE ANY OF THE FOLLOWING SYMPTOMS: fever, blisters, a rash inside your mouth, vaginal or anal pain, or altered mental status.

## 2022-05-29 NOTE — ED PROVIDER NOTE - PATIENT PORTAL LINK FT
You can access the FollowMyHealth Patient Portal offered by [facility] by registering at the following website: [email or web address]. By joining [email or web address]’s FollowMyHealth portal, you will also be able to view your health information using other applications (apps) compatible with our system.

## 2022-05-29 NOTE — ED ADULT NURSE NOTE - OBJECTIVE STATEMENT
Pt /o "worsening rash with itching since gardening in my yard the past 1 1/2 weeks. No fevers/sob/open wounds/drainage"

## 2022-06-17 ENCOUNTER — NON-APPOINTMENT (OUTPATIENT)
Age: 82
End: 2022-06-17

## 2022-06-17 ENCOUNTER — APPOINTMENT (OUTPATIENT)
Dept: ENDOCRINOLOGY | Facility: CLINIC | Age: 82
End: 2022-06-17
Payer: MEDICARE

## 2022-06-17 VITALS
HEIGHT: 61 IN | SYSTOLIC BLOOD PRESSURE: 186 MMHG | BODY MASS INDEX: 21.52 KG/M2 | TEMPERATURE: 98 F | OXYGEN SATURATION: 97 % | DIASTOLIC BLOOD PRESSURE: 81 MMHG | HEART RATE: 68 BPM | WEIGHT: 114 LBS

## 2022-06-17 DIAGNOSIS — I10 ESSENTIAL (PRIMARY) HYPERTENSION: ICD-10-CM

## 2022-06-17 DIAGNOSIS — D05.90 UNSPECIFIED TYPE OF CARCINOMA IN SITU OF UNSPECIFIED BREAST: ICD-10-CM

## 2022-06-17 LAB
GLUCOSE BLDC GLUCOMTR-MCNC: 148
HBA1C MFR BLD HPLC: 7.7

## 2022-06-17 PROCEDURE — 99205 OFFICE O/P NEW HI 60 MIN: CPT | Mod: 25

## 2022-06-17 PROCEDURE — 82962 GLUCOSE BLOOD TEST: CPT

## 2022-06-17 PROCEDURE — 83036 HEMOGLOBIN GLYCOSYLATED A1C: CPT | Mod: QW

## 2022-06-17 RX ORDER — SITAGLIPTIN 100 MG/1
100 TABLET, FILM COATED ORAL
Qty: 90 | Refills: 0 | Status: ACTIVE | COMMUNITY
Start: 2022-06-11

## 2022-06-17 RX ORDER — BLOOD-GLUCOSE METER
W/DEVICE EACH MISCELLANEOUS
Qty: 1 | Refills: 0 | Status: ACTIVE | COMMUNITY
Start: 2022-06-17 | End: 1900-01-01

## 2022-06-17 RX ORDER — LANCETS 33 GAUGE
EACH MISCELLANEOUS
Qty: 1 | Refills: 2 | Status: ACTIVE | COMMUNITY
Start: 2022-06-17 | End: 1900-01-01

## 2022-06-17 RX ORDER — AMLODIPINE BESYLATE AND BENAZEPRIL HYDROCHLORIDE 10; 20 MG/1; MG/1
10-20 CAPSULE ORAL
Refills: 0 | Status: ACTIVE | COMMUNITY

## 2022-06-17 RX ORDER — BLOOD SUGAR DIAGNOSTIC
STRIP MISCELLANEOUS TWICE DAILY
Qty: 1 | Refills: 2 | Status: ACTIVE | COMMUNITY
Start: 2022-06-17 | End: 1900-01-01

## 2022-06-17 RX ORDER — AMLODIPINE BESYLATE AND BENAZEPRIL HYDROCHLORIDE 10; 20 MG/1; MG/1
10-20 CAPSULE ORAL
Qty: 90 | Refills: 0 | Status: ACTIVE | COMMUNITY
Start: 2022-06-01

## 2022-06-17 NOTE — REASON FOR VISIT
[Initial Evaluation] : an initial evaluation [Thyroid nodule/ MNG] : thyroid nodule/ MNG [DM Type 2] : DM Type 2 [Osteoporosis] : osteoporosis [FreeTextEntry2] : Dr. Jesús Bruner

## 2022-06-17 NOTE — REVIEW OF SYSTEMS
[Fatigue] : no fatigue [Decreased Appetite] : appetite not decreased [Recent Weight Gain (___ Lbs)] : no recent weight gain [Recent Weight Loss (___ Lbs)] : no recent weight loss [Dysphagia] : no dysphagia [Dysphonia] : no dysphonia [Chest Pain] : no chest pain [Palpitations] : no palpitations [Shortness Of Breath] : no shortness of breath [Cough] : no cough [Nausea] : no nausea [Constipation] : no constipation [Vomiting] : no vomiting [Diarrhea] : no diarrhea [Joint Pain] : no joint pain [Headaches] : no headaches [Tremors] : no tremors [Depression] : no depression [Anxiety] : no anxiety [Cold Intolerance] : no cold intolerance [Heat Intolerance] : no heat intolerance

## 2022-06-17 NOTE — PHYSICAL EXAM
[Alert] : alert [Well Nourished] : well nourished [Healthy Appearance] : healthy appearance [No Acute Distress] : no acute distress [Well Healed Scar] : well healed scar [Normal Hearing] : hearing was normal [Normal S1, S2] : normal S1 and S2 [Normal Rate] : heart rate was normal [Normal Bowel Sounds] : normal bowel sounds [Not Tender] : non-tender [Soft] : abdomen soft [Normal Gait] : normal gait [No Clubbing, Cyanosis] : no clubbing  or cyanosis of the fingernails [No Joint Swelling] : no joint swelling seen [Foot Ulcers] : no foot ulcers [Right foot was examined, including] : right foot ~C was examined, including visual inspection with sensory and pulse exams [Left foot was examined, including] : left foot ~C was examined, including visual inspection with sensory and pulse exams [2+] : 2+ in the dorsalis pedis [#1 Diminished] : number 1 was normal [#2 Diminished] : number 2 was normal [#3 Diminished] : number 3 was normal [#4 Diminished] : number 4 was normal [#5 Diminished] : number 5 was normal [#6 Diminished] : number 6 was normal [#7 Diminished] : number 7 was normal [#8 Diminished] : number 8 was normal [#9 Diminished] : number 9 was normal [#10 Diminished] : number 10 was normal [No Motor Deficits] : the motor exam was normal [Normal Reflexes] : deep tendon reflexes were 2+ and symmetric [Normal Affect] : the affect was normal [Normal Insight/Judgement] : insight and judgment were intact [Normal Mood] : the mood was normal

## 2022-06-17 NOTE — HISTORY OF PRESENT ILLNESS
[FreeTextEntry1] : Patient is a 80 yo woman establishing endocrine care for thyroid nodules and type 2 diabetes\par \par Reports feeling dizziness and vertigo in February 2022.  In 1968 she was diagnosed with hyperthyroidism.  Had eye bulging, dry eyes and insomnia.  Was on pills in [facility] for a short period of time.  Patient had partial thyroidectomy in 1968.  She was doing well and without symptoms.  Then in 2012 was told she had hypothyroidism.  Felt fatigued and blood test confirmed hypothyroidism.  \par Started on Lt4 75 mcg po daily\par Adheres to medicines \par She went to the ED for investigation and CT scan revealed thyroid nodule.  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  A1c had improved to 6.6% and Januvia was stopped.  Then A1c increased again in 2021 to 11.4% and januvia restarted\par Currently on Januvia and Farxiga started in April 2022 based on an A1c of 9.8%\par Does not check sugars and has never done so\par Breakfast: sandwich with egg, slice of cheese and whole grain 1-2 pieces; coffee with milk\par Lunch: rice\par Dinner: pasta, Korean food, brown rice\par Denies snacking\par No soda and no juice\par Dilated eye exam: last year but not this year\par Reports no history of osteoporosis but states having had a bone density\par Serum glucose in the hospital was 328 in February 2022

## 2022-06-17 NOTE — CONSULT LETTER
[Dear  ___] : Dear  [unfilled], [Consult Letter:] : I had the pleasure of evaluating your patient, [unfilled]. [Please see my note below.] : Please see my note below. [Consult Closing:] : Thank you very much for allowing me to participate in the care of this patient.  If you have any questions, please do not hesitate to contact me. [Sincerely,] : Sincerely, [FreeTextEntry3] : Mimi Burr MD

## 2022-06-17 NOTE — ASSESSMENT
[Diabetes Foot Care] : diabetes foot care [Long Term Vascular Complications] : long term vascular complications of diabetes [Carbohydrate Consistent Diet] : carbohydrate consistent diet [Importance of Diet and Exercise] : importance of diet and exercise to improve glycemic control, achieve weight loss and improve cardiovascular health [Hypoglycemia Management] : hypoglycemia management [Self Monitoring of Blood Glucose] : self monitoring of blood glucose [Retinopathy Screening] : Patient was referred to ophthalmology for retinopathy screening [Levothyroxine] : The patient was instructed to take Levothyroxine on an empty stomach, separate from vitamins, and wait at least 30 minutes before eating [FreeTextEntry1] : Patient is a 80 yo woman with thyroid nodule, type 2 diabetes and hypothyroidism establishing endocrine care\par \par 1. Right thyroid nodule\par -patient with episodes of vertigo in the setting of elevated glucose > 300 in February 2022 \par -CTA revealed a 1.5 cm incidental thyroid nodule\par -reports history of Grave's Disease treated with partial lobectomy many years ago\par -denies compressive symptoms\par -referral for thyroid US provided today\par \par 2. Type 2 diabetes\par -patient's A1c has ranged from 11% to 6% throughout the past few years.  Most recent A1c in April 2022 9.8% at which point Farxiga was started\par -continue with januvia and farxiga.  The side effect profiles of each were discussed including nausea/GI side effects/UTI/mycotic infections\par -food recall reviewed. Diet is mostly home cooked but somewhat high in carbohydrates, limit carbs when possible\par -patient encouraged to check sugars; Rx for testing supplies sent to pharmacy\par -RN glucometer teach provided today\par -requires annual dilated eye exam\par -monofilament testing normal\par -check albumin/creatinine levels today\par -check lipid profile\par \par 3. Post surgical hypothyroid\par -clinically euthyroid\par -check TFTs\par -adheres to Lt4 75 mcg po daily, adjust based on results\par \par 4. HTN\par -patient's BP above goal\par -she did not take her BP medicine today\par -patient can follow up with PCP for BP management\par \par Follow up in 4 months

## 2022-06-20 LAB
ANION GAP SERPL CALC-SCNC: 13 MMOL/L
BUN SERPL-MCNC: 19 MG/DL
CALCIUM SERPL-MCNC: 10.1 MG/DL
CHLORIDE SERPL-SCNC: 102 MMOL/L
CHOLEST SERPL-MCNC: 202 MG/DL
CO2 SERPL-SCNC: 23 MMOL/L
CREAT SERPL-MCNC: 0.82 MG/DL
CREAT SPEC-SCNC: 30 MG/DL
EGFR: 73 ML/MIN/1.73M2
ESTIMATED AVERAGE GLUCOSE: 183 MG/DL
GLUCOSE SERPL-MCNC: 116 MG/DL
HBA1C MFR BLD HPLC: 8 %
HDLC SERPL-MCNC: 50 MG/DL
LDLC SERPL CALC-MCNC: 113 MG/DL
MICROALBUMIN 24H UR DL<=1MG/L-MCNC: <1.2 MG/DL
MICROALBUMIN/CREAT 24H UR-RTO: NORMAL MG/G
NONHDLC SERPL-MCNC: 152 MG/DL
POTASSIUM SERPL-SCNC: 4.5 MMOL/L
SODIUM SERPL-SCNC: 139 MMOL/L
T4 FREE SERPL-MCNC: 1.5 NG/DL
TRIGL SERPL-MCNC: 199 MG/DL
TSH SERPL-ACNC: 2.8 UIU/ML

## 2022-06-20 RX ORDER — ATORVASTATIN CALCIUM 10 MG/1
10 TABLET, FILM COATED ORAL
Refills: 0 | Status: ACTIVE | COMMUNITY
Start: 2022-06-20

## 2022-06-20 RX ORDER — DAPAGLIFLOZIN 10 MG/1
10 TABLET, FILM COATED ORAL DAILY
Qty: 90 | Refills: 3 | Status: ACTIVE | COMMUNITY
Start: 2022-05-03 | End: 1900-01-01

## 2022-07-06 ENCOUNTER — RESULT REVIEW (OUTPATIENT)
Age: 82
End: 2022-07-06

## 2022-07-06 ENCOUNTER — OUTPATIENT (OUTPATIENT)
Dept: OUTPATIENT SERVICES | Facility: HOSPITAL | Age: 82
LOS: 1 days | End: 2022-07-06
Payer: MEDICARE

## 2022-07-06 ENCOUNTER — APPOINTMENT (OUTPATIENT)
Dept: ULTRASOUND IMAGING | Facility: HOSPITAL | Age: 82
End: 2022-07-06

## 2022-07-06 DIAGNOSIS — E04.1 NONTOXIC SINGLE THYROID NODULE: ICD-10-CM

## 2022-07-06 PROCEDURE — 76536 US EXAM OF HEAD AND NECK: CPT

## 2022-07-06 PROCEDURE — 76536 US EXAM OF HEAD AND NECK: CPT | Mod: 26

## 2022-07-15 ENCOUNTER — NON-APPOINTMENT (OUTPATIENT)
Age: 82
End: 2022-07-15

## 2022-09-20 ENCOUNTER — APPOINTMENT (OUTPATIENT)
Dept: ENDOCRINOLOGY | Facility: CLINIC | Age: 82
End: 2022-09-20

## 2022-10-07 ENCOUNTER — APPOINTMENT (OUTPATIENT)
Dept: ENDOCRINOLOGY | Facility: CLINIC | Age: 82
End: 2022-10-07

## 2023-08-31 NOTE — ED ADULT NURSE NOTE - DRUG PRE-SCREENING (DAST -1)
Caller: Latonya Mei    Relationship to patient: Self    Best call back number: [phone]    Patient is needing: PT WAS NEEDING TO TALK TO GREYSONJESÚS TODD, SHE HAS BEEN PASSING BLOOD THROUGH HER STOOL ALL MORNING. PLEASE CALL BACK AND ADVISE.      Statement Selected

## 2023-10-26 ENCOUNTER — APPOINTMENT (OUTPATIENT)
Dept: ENDOCRINOLOGY | Facility: CLINIC | Age: 83
End: 2023-10-26
Payer: MEDICARE

## 2023-10-26 VITALS
BODY MASS INDEX: 22.47 KG/M2 | SYSTOLIC BLOOD PRESSURE: 120 MMHG | HEART RATE: 16 BPM | DIASTOLIC BLOOD PRESSURE: 60 MMHG | WEIGHT: 119 LBS | OXYGEN SATURATION: 95 % | HEIGHT: 61 IN

## 2023-10-26 PROCEDURE — 99215 OFFICE O/P EST HI 40 MIN: CPT | Mod: 25

## 2023-11-15 ENCOUNTER — LABORATORY RESULT (OUTPATIENT)
Age: 83
End: 2023-11-15

## 2023-11-15 ENCOUNTER — APPOINTMENT (OUTPATIENT)
Dept: ENDOCRINOLOGY | Facility: CLINIC | Age: 83
End: 2023-11-15
Payer: MEDICARE

## 2023-11-15 VITALS
HEART RATE: 60 BPM | OXYGEN SATURATION: 98 % | WEIGHT: 113 LBS | BODY MASS INDEX: 21.34 KG/M2 | HEIGHT: 61 IN | DIASTOLIC BLOOD PRESSURE: 60 MMHG | SYSTOLIC BLOOD PRESSURE: 120 MMHG

## 2023-11-15 PROCEDURE — 10005 FNA BX W/US GDN 1ST LES: CPT

## 2023-12-13 LAB — FNA, THYROID: NORMAL

## 2024-06-12 ENCOUNTER — OUTPATIENT (OUTPATIENT)
Dept: OUTPATIENT SERVICES | Facility: HOSPITAL | Age: 84
LOS: 1 days | End: 2024-06-12
Payer: MEDICARE

## 2024-06-12 ENCOUNTER — APPOINTMENT (OUTPATIENT)
Dept: ULTRASOUND IMAGING | Facility: HOSPITAL | Age: 84
End: 2024-06-12
Payer: MEDICARE

## 2024-06-12 DIAGNOSIS — E04.1 NONTOXIC SINGLE THYROID NODULE: ICD-10-CM

## 2024-06-12 PROCEDURE — 76536 US EXAM OF HEAD AND NECK: CPT

## 2024-06-12 PROCEDURE — 76536 US EXAM OF HEAD AND NECK: CPT | Mod: 26

## 2024-06-20 ENCOUNTER — APPOINTMENT (OUTPATIENT)
Dept: ENDOCRINOLOGY | Facility: CLINIC | Age: 84
End: 2024-06-20
Payer: MEDICARE

## 2024-06-20 VITALS
HEART RATE: 64 BPM | HEIGHT: 61 IN | DIASTOLIC BLOOD PRESSURE: 62 MMHG | SYSTOLIC BLOOD PRESSURE: 130 MMHG | BODY MASS INDEX: 21.9 KG/M2 | OXYGEN SATURATION: 98 % | WEIGHT: 116 LBS

## 2024-06-20 DIAGNOSIS — E04.1 NONTOXIC SINGLE THYROID NODULE: ICD-10-CM

## 2024-06-20 DIAGNOSIS — E11.9 TYPE 2 DIABETES MELLITUS W/OUT COMPLICATIONS: ICD-10-CM

## 2024-06-20 DIAGNOSIS — E03.9 HYPOTHYROIDISM, UNSPECIFIED: ICD-10-CM

## 2024-06-20 PROCEDURE — 99214 OFFICE O/P EST MOD 30 MIN: CPT

## 2024-06-20 PROCEDURE — G2211 COMPLEX E/M VISIT ADD ON: CPT

## 2024-06-20 NOTE — HISTORY OF PRESENT ILLNESS
[FreeTextEntry1] : CHIEF COMPLAINT: Thyroid nodule, type 2 diabetes  HISTORY OF PRESENTING ILLNESS: The patient is a 83-year-old female being seen in the office today for evaluation of thyroid nodule, type 2 diabetes.  She also has a history of breast cancer, had a total mastectomy but no radiation.  THYROID NODULE: She reports a history of hyperthyroidism, possibly Graves' disease diagnosed in 1968 when she had bulging eyes.  She was initially managed with medications which did not work, and then underwent a partial thyroidectomy. She did well without any medications for many years.  In 2012, she was diagnosed with hypothyroidism and has since been taking levothyroxine 75 mcg daily, takes it appropriately.  Normal TFTs from 5/2024.  She was in the hospital for dizziness/vertigo in 2/2022, when CT scan of the head incidentally showed a right-sided thyroid nodule. Most recent thyroid US 6/12/2024: RUP 1.2 cm TR 4 nodule with peripheral calcifications, stable from prior. 11/15/2023 FNA RUP 1.3 cm nodule AUS, with negative TSV 3 though with low thyroid cell count.  She denies any symptoms of hypothyroidism or hyperthyroidism.  No palpitations/tremors, no lower extremity swelling. Compressive symptoms: No neck swelling, no dysphagia, no dyspnea.  She noticed new onset of hoarseness of voice that started around spring 2023, but feels that it this has been stable/improving over time. Eye symptoms: No proptosis or eye swelling. No stare.   Family history: Sister with thyroid disease. No personal history of radiation exposure in the head and neck area.   DIABETES HPI: She has type 2 diabetes, diagnosed in 2017 with A1c of 11.2%, she was started on Januvia.  A1c improved to 6.6% in 2018 and Januvia was stopped, which resulted in an increase in A1c again. Currently, she is on Januvia 100 mg daily and Farxiga 5 mg daily.  A1c 7.1% 6/20/2024. 7.2% from 5/2024 --> PCP recommended Farxiga 10 mg daily, but she felt dizziness and shaking, so decreased back to 5 mg daily. 6.4% from 10/9/2023.  Does not check blood sugars at home. Denies any known history of CAD, CVA, PAD. Has mild nephropathy.  Denies any neuropathy or retinopathy. Last retinopathy screening was 6/2024, reports no DR. She has glaucoma. Urine ACR 19 from 5/1/2024, EGFR 60.  Hypertension: Takes amlodipine 10 mg/benazepril 20 mg Hyperlipidemia: Takes atorvastatin 20 mg daily  Outside labs reviewed from 5/1/2024: eGFR 60, TSH 3.26, A1c 7.2%, urine ACR 19

## 2024-06-20 NOTE — PHYSICAL EXAM
[TextEntry] : PHYSICAL EXAMINATION: Vital signs from today's encounter reviewed.  GENERAL: No acute distress, clinically eukinetic, normal appearance EYES: conjunctivae are pink and moist, no icterus, no proptosis  NECK: thyroid is not enlarged/nodular on palpation, non-tender, no adenopathy CARDIOVASCULAR: well-perfused extremities, no peripheral edema RESPIRATORY: normal chest expansion with good pulmonary effort, no acute respiratory distress NEUROLOGIC: alert and oriented, no evident focal deficits, no tremors  PSYCHIATRIC: mood and affect are normal

## 2024-06-20 NOTE — ASSESSMENT
[FreeTextEntry1] : 1. Thyroid Nodule Most recent thyroid US 6/12/2024: RUP 1.2 cm TR 4 nodule with peripheral calcifications, stable from prior. 11/15/2023 FNA RUP 1.3 cm nodule AUS, with negative TSV 3 though with low thyroid cell count.  -Given low thyroid cell count in the sample for molecular testing, recommend repeat FNA of RUP 1.2 cm nodule, with repeat molecular testing if result is again AUS.  Can repeat in 6 to 12 months from prior FNA, will schedule appointment.  2.  Hypothyroidism -Continue levothyroxine 75 mcg daily, taking it appropriately -Check TSH and free T4 every 6 to 12 months for dose adjustment, normal from 5/1/2024  3. Type 2 Diabetes A1c 7.1% 6/20/2024 Home regimen: Januvia 100 mg daily, Farxiga 5 mg daily -Continue current regimen, A1c under good control.  Continue dietary modifications and increase physical exercise. -Continue annual ophthalmology visits for retinopathy screening -Continue valsartan and farxiga for mild nephropathy, continue annual nephropathy screening.  Negative urine ACR from 5/1/2024. -Continue atorvastatin 20 mg daily for hyperlipidemia, amlodipine/benazepril for hypertension  RTC for FNA in 3 to 4 months.  Shelbi Erickson MD [facility] at [address], [address] Ph: [phone] Fax: [phone]

## 2024-06-20 NOTE — REVIEW OF SYSTEMS
[TextEntry] : REVIEW OF SYSTEMS: Cardiovascular: No lower extremity swelling, no palpitations Neurologic: No tremors Psychiatric: The patient is not currently nervous or anxious  Endocrine: No thyroid/neck swelling, no heat/cold intolerance   The review of systems is otherwise negative except as noted in HPI.

## 2024-08-27 NOTE — ED PROVIDER NOTE - DISCHARGE DATE
Supplies gathered and entered pts room and advised pt of orders for inwelling catheter per Dr. Corley. Pt declines mendez at this time and states she wants pain medication prior to insertion to mendez and to speak with the doctor. MD made aware    11-Feb-2022

## 2024-11-06 ENCOUNTER — APPOINTMENT (OUTPATIENT)
Dept: ENDOCRINOLOGY | Facility: CLINIC | Age: 84
End: 2024-11-06
Payer: MEDICARE

## 2024-11-06 VITALS
HEART RATE: 68 BPM | DIASTOLIC BLOOD PRESSURE: 78 MMHG | WEIGHT: 114 LBS | HEIGHT: 61 IN | BODY MASS INDEX: 21.52 KG/M2 | OXYGEN SATURATION: 96 % | SYSTOLIC BLOOD PRESSURE: 166 MMHG

## 2024-11-06 DIAGNOSIS — E11.9 TYPE 2 DIABETES MELLITUS W/OUT COMPLICATIONS: ICD-10-CM

## 2024-11-06 DIAGNOSIS — E03.9 HYPOTHYROIDISM, UNSPECIFIED: ICD-10-CM

## 2024-11-06 DIAGNOSIS — E04.1 NONTOXIC SINGLE THYROID NODULE: ICD-10-CM

## 2024-11-06 LAB — HBA1C MFR BLD HPLC: 6.7

## 2024-11-06 PROCEDURE — 10005 FNA BX W/US GDN 1ST LES: CPT

## 2024-11-06 PROCEDURE — 99214 OFFICE O/P EST MOD 30 MIN: CPT

## 2024-11-06 PROCEDURE — 83036 HEMOGLOBIN GLYCOSYLATED A1C: CPT | Mod: QW

## 2024-11-07 ENCOUNTER — NON-APPOINTMENT (OUTPATIENT)
Age: 84
End: 2024-11-07

## 2024-11-08 LAB — FNA, THYROID: NORMAL

## 2025-01-21 NOTE — ED PROVIDER NOTE - CARDIAC PEDAL EDEMA
----- Message from ANNY Ochoa sent at 1/21/2025  3:43 AM CST -----  Pregnancy test is negative. The muscle relaxant was sent to the pharmacy.   absent

## 2025-05-09 ENCOUNTER — APPOINTMENT (OUTPATIENT)
Dept: ENDOCRINOLOGY | Facility: CLINIC | Age: 85
End: 2025-05-09
Payer: MEDICARE

## 2025-05-09 VITALS
BODY MASS INDEX: 21.52 KG/M2 | HEIGHT: 61 IN | WEIGHT: 114 LBS | SYSTOLIC BLOOD PRESSURE: 142 MMHG | DIASTOLIC BLOOD PRESSURE: 68 MMHG | HEART RATE: 61 BPM | OXYGEN SATURATION: 98 %

## 2025-05-09 DIAGNOSIS — E78.5 HYPERLIPIDEMIA, UNSPECIFIED: ICD-10-CM

## 2025-05-09 DIAGNOSIS — E11.9 TYPE 2 DIABETES MELLITUS W/OUT COMPLICATIONS: ICD-10-CM

## 2025-05-09 DIAGNOSIS — E03.9 HYPOTHYROIDISM, UNSPECIFIED: ICD-10-CM

## 2025-05-09 DIAGNOSIS — E04.1 NONTOXIC SINGLE THYROID NODULE: ICD-10-CM

## 2025-05-09 DIAGNOSIS — I10 ESSENTIAL (PRIMARY) HYPERTENSION: ICD-10-CM

## 2025-05-09 LAB — HBA1C MFR BLD HPLC: 6.7

## 2025-05-09 PROCEDURE — 83036 HEMOGLOBIN GLYCOSYLATED A1C: CPT | Mod: QW

## 2025-05-09 PROCEDURE — G2211 COMPLEX E/M VISIT ADD ON: CPT

## 2025-05-09 PROCEDURE — 99214 OFFICE O/P EST MOD 30 MIN: CPT

## 2025-05-10 LAB
ALBUMIN SERPL ELPH-MCNC: 4.5 G/DL
ALP BLD-CCNC: 74 U/L
ALT SERPL-CCNC: 16 U/L
ANION GAP SERPL CALC-SCNC: 16 MMOL/L
AST SERPL-CCNC: 24 U/L
BILIRUB SERPL-MCNC: 0.5 MG/DL
BUN SERPL-MCNC: 22 MG/DL
CALCIUM SERPL-MCNC: 9.9 MG/DL
CHLORIDE SERPL-SCNC: 102 MMOL/L
CHOLEST SERPL-MCNC: 203 MG/DL
CO2 SERPL-SCNC: 23 MMOL/L
CREAT SERPL-MCNC: 0.86 MG/DL
CREAT SPEC-SCNC: 39 MG/DL
EGFRCR SERPLBLD CKD-EPI 2021: 67 ML/MIN/1.73M2
GLUCOSE SERPL-MCNC: 75 MG/DL
HDLC SERPL-MCNC: 51 MG/DL
LDLC SERPL-MCNC: 127 MG/DL
MICROALBUMIN 24H UR DL<=1MG/L-MCNC: <1.2 MG/DL
MICROALBUMIN/CREAT 24H UR-RTO: NORMAL MG/G
NONHDLC SERPL-MCNC: 153 MG/DL
POTASSIUM SERPL-SCNC: 4.3 MMOL/L
PROT SERPL-MCNC: 7.8 G/DL
SODIUM SERPL-SCNC: 141 MMOL/L
T4 FREE SERPL-MCNC: 1.5 NG/DL
TRIGL SERPL-MCNC: 148 MG/DL
TSH SERPL-ACNC: 1.34 UIU/ML

## 2025-06-13 NOTE — ED PROVIDER NOTE - CHILD ABUSE FACILITY
hip (right)     Posture:   WFL    Bandages/Dressings/Incisions:  Not Applicable    Dermatomes: Abnormal findings listed below  All WNL    Myotomes: Abnormal findings listed below  All WNL    Reflexes: Abnormal findings listed below  Not Tested    Specific Joint Mobility Testing/Accessory Motions:      Hip:  hypomobile on R on L    Special Tests:  FADIR (GARY/LABRAL/PSOAS): Negative  Abdiel test: positive on R   Ely's (rectus femoris tightness): Positive on R and Positive on L    Gait:    Pattern: WFL   Assistive Device Used: no AD    Balance:   NT    Falls Risk Assessment (30 days):   Falls Risk assessed and no intervention required.  Time Up and Go (TUG):   Not Assessed        Exercises/Interventions     Bike 6 min Added 6/13    Exercise/Equipment Resistance/Repetitions Other comments   Stretching     Hamstring 3x30\" strap    Towel Pull     Inclined Calf     Hip Flexion     ITB     Piriformis  3x30\"     Hip flexor stretch off EOB 3x30\"                    SLR     Standing flexion 3x10  Added 6/13   Abduction - standing  3x10  Added 6/13   Adduction     Prone     SLR+          Isometrics     Abduction iso SL  10x10\" GTB    Ball squeeze  10x10\"          Patellar Glides     Medial     Superior     Inferior          ROM     Sheet Pulls     Hang Weights     Passive     Active     Weight Shift     Ankle Pumps                    CKC     Calf raises     Wall sits     Step ups     1 leg stand     Sit to stand w/ aero  3x10  Added 6/13   CC TKE     Balance     bridges 3x10    Hook lying knee fall out  3x10 GTB  Added 6/13   Hook lying marching   Standing marching at wall  3x10 GTB    3x10 GTB  Added 6/13   Lateral band walking  3x GTB SBA for safety  Added 6/13        PRE     Extension  RANGE:   Flexion  RANGE:        Quantum machines     Leg press      Leg extension     Leg curl          Manual interventions                 Modalities:    No modalities applied this session    Education/Home Exercise Program: Patient HEP 
[facility]